# Patient Record
(demographics unavailable — no encounter records)

---

## 2017-04-24 NOTE — CONSCIOUS SEDATION/ASA
Conscious Sedation Pre-Proced


ASA Class:  2











Airway Mallampati Classification: ([place] appropriate class) I.  II.  III,  IV


 


Lungs 


 


Heart 


 


 ASA score


 


 ASA 1: a normal healthy patient


 


 ASA 2:  a patient with a mild systemic disease (mid diabetes, controlled 

hypertension, obesity 


 


 ASA 3:  a patient with a severe systemic disease that limits activity  (angina

, COPD, prior Myocardial infarction)


 


 ASA 4:  a patient with an incapacitating disease that is a constant threat to 

life (CHF, renal failure)


 


 ASA 5:  a moribund patient not expected to survive 24 hrs.  (ruptured aneurysm)


 


 ASA 6:  a declared brain dead patient whose organs are being harvested.


 


 For emergent operations, add the letter E after the classification








Grade 1


Sedation Plan:  Discussed options with patient/fam


Note


The patient is an appropriate candidate to undergo the planned procedure, 

sedation, and anesthesia.





The patient immediately re-assessed prior to indication.











CHIOMA HUNTER MD Apr 24, 2017 10:09 am

## 2017-04-24 NOTE — OPERATIVE REPORT
DATE OF SERVICE:  04/24/2017



PROCEDURE:

Upper gastrointestinal endoscopy with antral biopsy.



SURGEON:

Dr. Hunter.



INDICATIONS FOR PROCEDURE:

This patient came in for upper endoscopy to evaluate epigastric pain and

intermittent dysphagia.  Informed consent was obtained after reviewing the

procedure in detail.



DESCRIPTION OF PROCEDURE:

She was placed in left lateral decubitus position, and her vital signs were

monitored.  Conscious sedation was achieved using Versed and Fentanyl.  The

flexible gastroscope was introduced down the esophagus, past the stomach, into

the proximal duodenum.



FINDINGS:

ESOPHAGUS:

1.  Quite tortuous, normal variation in anatomy.

2.  Grade 2 esophagitis.



STOMACH:

1.  Multiple distal gastric ulcers.

2.  Severe gastritis in a streaky fashion involving the distal stomach.  Antral

biopsy was obtained for Helicobacter.



DUODENDUM:

A few, shallow erosions were found along the first part.  



She tolerated the procedure well and was taken back to the nursing area in a 
stable condition.



IMPRESSION:

Epigastric pain, dysphagia.  No stricture.  Esophagitis and gastric ulcers. 

Helicobacter status pending.





Job ID: [id number]

DocumentID: [id number]

Dictated Date:  04/24/2017 11:34:38

Transcription Date: 04/24/2017 12:08:25

Dictated By: CHIOMA HUNTER MD

[facility]

## 2017-04-24 NOTE — DISCHARGE INST-SIMPLE/STANDARD
Discharge Inst-Standard


Discharge Medications


New, Converted or Re-Newed RX:  Other





Patient Instructions/Follow Up


Plan of Care/Instructions/FU:  


follow-up with her physician


Activity as Tolerated:  Yes


Discharge Diet:  No Restrictions











CHIOMA HUNTER MD Apr 24, 2017 11:39 am

## 2017-04-24 NOTE — ENDOSCOPY PROCEDURE REPORT
Endoscopy Report


Date:  Apr 24, 2017


Preoperative Diagnosis:  epigastric pain.  Dysphagia


Study Performed:  Upper Endoscopy


Procedure Instrument:  Endoscope





Endo Procedure/Findings


Findings


1.:  Hiatal Hernia, Gastric Ulcer, Gastritis





Recommendations:


Recommendations:


1.:  Start Medication(s)





Copy


Copies To 1:   VIRIDIANA TAYLOR MD, XAVIER M MD Apr 24, 2017 11:37 am

## 2017-05-04 NOTE — ED ABDOMINAL PAIN
General


Chief Complaint:  Abdominal/GI Problems


Stated Complaint:  ABD PAIN


Nursing Triage Note:  


C/O EPIGASTRIC PAIN X 3 DAYS, PATIENT REPORTS HAD AN UPPER GI DONE ON THE 24TH OF APRIL AND TOLD SHE HAD ULCERS AND WAS GIVEN NEXIUM


Sepsis Screen:  No Definite Risk


Source of Information:  Patient


Exam Limitations:  No Limitations





History of Present Illness


Time Seen By Provider:  15:24


Initial Comments


The patient is a 32-year-old white female who presents to the emergency room 

with a tale of WOE beginning 2 years ago.  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 

spasms.  She reports that several weeks ago they were in [place] and she 

had an attack which made her think she was having a heart attack.  She 

remembered reading that this could in fact be caused by esophageal spasm and 

even respond to nitroglycerin.  She went to a [place] emergency room where 

she was worked up.  She was assured that there was no heart attack and states 

that she was doped up in order to allow her to return home.  She was then seen 

by her physician and referred to Dr. Echevarria who performed an upper endoscopy.  

She also notes that she was told in [place] that she had a polyp in her 

gallbladder and perhaps that was causing a problem.  Dr. Echevarria reported that 

the esophagus had considerable inflammation and even ulcers.  She was placed on 

Nexium by VIRIDIANA Montes and has taken it for a week without any improvement 

in her symptomatology.  She reports that she feels very bloated at this time.  

There has been no change in her bowel movements.


Timing/Duration:  1 Day, Other (2 years)


Severity/Quality:  Moderate, Severe


Location:  RUQ, Epigastric


Modifying Factors:  Improves With Analgesics, Improves With Antacids





Allergies and Home Medications


Allergies


Coded Allergies:  


     No Known Drug Allergies (Verified , 12/7/07)





Home Medications


Escitalopram Oxalate 10 Mg Tablet, 10 MG PO DAILY, (Reported)


Esomeprazole Magnesium 20 Mg Tablet.dr, 20 MG PO, (Reported)





Review of Systems


Constitutional:  see HPI


EENTM:  No Symptoms Reported


Respiratory:  No Symptoms Reported


Cardiovascular:  No Symptoms Reported


Gastrointestinal:  See HPI, Abdomen Distended, Blood Streaked Stools, 

Difficulty Swallowing


Genitourinary:  No Symptoms Reported


Musculoskeletal:  no symptoms reported


Skin:  no symptoms reported


Psychiatric/Neurological:  No Symptoms Reported


Endocrine:  No Symptoms Reported


Hematologic/Lymphatic:  No Symptoms Reported





Past Medical-Social-Family Hx


Patient Social History


Alcohol Use:  Rarely Uses


Recreational Drug Use:  Yes (OCCASSIONAL SMOKES MARIJUANA)


Type Used:  Cigarettes


Recent Foreign Travel:  No


Contact w/Someone Who Travel:  No


Recent Infectious Disease Expo:  No


Recent Hopitalizations:  No





Immunizations Up To Date


Tetanus Booster (TDap):  Unknown





Seasonal Allergies


Seasonal Allergies:  Yes





Surgeries


HX Surgeries:  Yes ( bilat salpingectomy)


Surgeries:  Tubal Ligation





Respiratory


Hx Respiratory Disorders:  No





Cardiovascular


Hx Cardiac Disorders:  No





Neurological


Hx Neurological Disorders:  No





Reproductive System


Hx Reproductive Disorders:  Yes (Salpingectomy bilat)


Sexually Transmitted Disease:  No


Female Reproductive Disorders:  Ovarian Cyst


GYN History:  Tubal Ligation





Genitourinary


Hx Genitourinary Disorders:  No


Genitourinary Disorders:  Kidney Infection





Gastrointestinal


Hx Gastrointestinal Disorders:  No


Gastrointestinal Disorders:  Gastroesophageal Reflux





Musculoskeletal


Hx Musculoskeletal Disorders:  Yes (chronic left hip pain)


Musculoskeletal Disorders:  Arthritis





Endocrine


Hx Endocrine Disorders:  No





HEENT


HX ENT Disorders:  No (tooth infection)





Cancer


Hx Cancer:  No





Psychosocial


Hx Psychiatric Problems:  No





Integumentary


HX Skin/Integumentary Disorder:  No





Blood Transfusions


Hx Blood Disorders:  No





Family Medical History


Significant Family History:  No Pertinent Family Hx





Physical Exam


Vital Signs





 VS - Last 72 Hours, by Label








 5/4/17





 14:19


 


Temp 98.2


 


Pulse 97


 


Resp 18


 


B/P (MAP) 105/81


 


Pulse Ox 100





Capillary Refill : Less Than 3 Seconds


General Appearance:  mild distress


HEENT:  normal ENT inspection


Neck:  full range of motion


Respiratory:  chest non-tender, lungs clear, normal breath sounds, no 

respiratory distress, no accessory muscle use


Cardiovascular:  normal peripheral pulses, regular rate, rhythm, no edema, no 

gallop, no JVD, no murmur


Gastrointestinal:  tenderness (mild pain to palpation without guarding or 

rebound primarily at the epigastrium and right upper quadrant)


Extremities:  normal range of motion, non-tender, normal inspection, no pedal 

edema, no calf tenderness, normal capillary refill, pelvis stable


Back:  normal inspection, no CVA tenderness


Skin:  normal color, warm/dry


Lymphatic:  no adenopathy





Progress/Results/Core Measures


Results/Orders


Lab Results





Laboratory Tests








Test


  5/4/17


14:33 Range/Units


 


 


White Blood Count


  5.9 


  4.3-11.0


10^3/uL


 


Red Blood Count


  4.40 


  4.35-5.85


10^6/uL


 


Hemoglobin 13.7  11.5-16.0  G/DL


 


Hematocrit 41  35-52  %


 


Mean Corpuscular Volume 92  80-99  FL


 


Mean Corpuscular Hemoglobin 31  25-34  PG


 


Mean Corpuscular Hemoglobin


Concent 34 


  32-36  G/DL


 


 


Red Cell Distribution Width 12.3  10.0-14.5  %


 


Platelet Count


  253 


  130-400


10^3/uL


 


Mean Platelet Volume 9.8  7.4-10.4  FL


 


Neutrophils (%) (Auto) 51  42-75  %


 


Lymphocytes (%) (Auto) 37  12-44  %


 


Monocytes (%) (Auto) 11  0-12  %


 


Eosinophils (%) (Auto) 1  0-10  %


 


Basophils (%) (Auto) 0  0-10  %


 


Neutrophils # (Auto) 3.0  1.8-7.8  X 10^3


 


Lymphocytes # (Auto) 2.2  1.0-4.0  X 10^3


 


Monocytes # (Auto) 0.7  0.0-1.0  X 10^3


 


Eosinophils # (Auto)


  0.1 


  0.0-0.3


10^3/uL


 


Basophils # (Auto)


  0.0 


  0.0-0.1


10^3/uL


 


Sodium Level 140  135-145  MMOL/L


 


Potassium Level 3.8  3.6-5.0  MMOL/L


 


Chloride Level 106    MMOL/L


 


Carbon Dioxide Level 29  21-32  MMOL/L


 


Anion Gap 5  5-14  MMOL/L


 


Blood Urea Nitrogen 13  7-18  MG/DL


 


Creatinine


  0.74 


  0.60-1.30


MG/DL


 


Estimat Glomerular Filtration


Rate > 60 


   


 


 


BUN/Creatinine Ratio 18   


 


Glucose Level 86    MG/DL


 


Calcium Level 9.4  8.5-10.1  MG/DL


 


Total Bilirubin 1.0  0.1-1.0  MG/DL


 


Aspartate Amino Transf


(AST/SGOT) 16 


  5-34  U/L


 


 


Alanine Aminotransferase


(ALT/SGPT) 27 


  0-55  U/L


 


 


Alkaline Phosphatase 53    U/L


 


Total Protein 7.0  6.4-8.2  G/DL


 


Albumin 4.7 H 3.2-4.5  G/DL


 


Lipase 31  8-78  U/L








My Orders





Orders - CASSIUS PÉREZ MD


Cbc With Automated Diff (5/4/17 14:22)


Comprehensive Metabolic Panel (5/4/17 14:22)


Lipase (5/4/17 14:22)


Ua Culture If Indicated (5/4/17 14:22)


Sucralfate  Tablet (Carafate  Tablet) (5/4/17 15:45)





Vital Signs/I&O





Vital Sign - Last 12Hours








 5/4/17





 14:19


 


Temp 98.2


 


Pulse 97


 


Resp 18


 


B/P (MAP) 105/81


 


Pulse Ox 100














Blood Pressure Mean:  89











Departure


Communication


Progress Notes


1611 discussed with Dr. Echevarria.  He confirmed that he had seen severe 

inflammation and ulceration in the distal esophagus.  He had started the Nexium 

one week ago.  I allowed that she had not noted any improvement at this point.  

We agreed to add Gaviscon which will be done.  He will also attempt to arrange 

a gastroenterology consultation and most likely in [place] and he will call her 

with the information when he has done so.





Impression


Impression:  


 Primary Impression:  


 severe distal esophagitis


Disposition:  01 HOME, SELF-CARE


Condition:  Stable/Unchanged





Departure-Patient Inst.


Decision time for Depature:  16:11


Referrals:  


VIRIDIANA TAYLOR MD (PCP/Family)


Primary Care Physician





Add. Discharge Instructions:  


All discharge instructions reviewed with patient and/or family. Voiced 

understanding.


Continue Nexium.  Add Gaviscon for times daily


Dr. Echevarria will arrange gastroenterology consultation for you and will call 

you back when he has done so.


Scripts


Sucralfate (Sucralfate) 1 Gm Tablet


1 GM PO 4 times a day, #20 TAB


   Crush with spoon and make a slurry with water


   Prov: CASSIUS PÉREZ MD         5/4/17











CASSIUS PÉREZ MD May 4, 2017 15:29

## 2018-04-18 NOTE — DIAGNOSTIC IMAGING REPORT
INDICATION:  Hurt thumb 3 weeks ago now with swelling and

bruising



TECHNIQUE:  Three views of the right hand.



CORRELATION STUDY:  None



FINDINGS: 

There is normal alignment and appearance of the osseous

structures of the hand. The joint spaces are maintained. There is

no acute fracture.   Soft tissues are unremarkable.     



IMPRESSION: 

1. Negative for acute bony abnormality of the right hand.     



Dictated by: 



  Dictated on workstation # [id number]